# Patient Record
Sex: FEMALE | Race: WHITE | NOT HISPANIC OR LATINO | Employment: FULL TIME | URBAN - METROPOLITAN AREA
[De-identification: names, ages, dates, MRNs, and addresses within clinical notes are randomized per-mention and may not be internally consistent; named-entity substitution may affect disease eponyms.]

---

## 2023-02-28 LAB
AMPHETAMINE SCREEN BLOOD: NEGATIVE NG/ML
BARBITURATE SCREEN BLOOD: NEGATIVE NG/ML
BENZODIAZEPINES SCREEN BLOOD: NEGATIVE NG/ML
BUPRENORPHINE SCREEN BLOOD: NEGATIVE NG/ML
CANNABINOIDS SCREEN BLOOD: NEGATIVE NG/ML
COCAINE SCREEN BLOOD: NEGATIVE NG/ML
DRUG SCREEN COMMENT BLOOD: NORMAL
METHADONE SCREEN BLOOD: NEGATIVE NG/ML
METHAMPHETAMINE, BLOOD, SCREEN: NEGATIVE NG/ML
OPIATE SCREEN BLOOD: NEGATIVE NG/ML
OXYCODONE SCREEN BLOOD: NEGATIVE NG/ML
PHENCYCLIDINE SCREEN BLOOD: NEGATIVE NG/ML

## 2023-03-01 LAB
NIL(NEG) CONTROL SPOT COUNT: NORMAL
PANEL A SPOT COUNT: 0
PANEL B SPOT COUNT: 0
POS CONTROL SPOT COUNT: NORMAL
T-SPOT. TB INTERPRETATION: NEGATIVE

## 2023-09-11 LAB
ALANINE AMINOTRANSFERASE (SGPT) (U/L) IN SER/PLAS: 23 U/L (ref 7–45)
ALBUMIN (G/DL) IN SER/PLAS: 4 G/DL (ref 3.4–5)
ALKALINE PHOSPHATASE (U/L) IN SER/PLAS: 47 U/L (ref 33–110)
ANION GAP IN SER/PLAS: 12 MMOL/L (ref 10–20)
ASPARTATE AMINOTRANSFERASE (SGOT) (U/L) IN SER/PLAS: 19 U/L (ref 9–39)
BASOPHILS (10*3/UL) IN BLOOD BY AUTOMATED COUNT: 0.1 X10E9/L (ref 0–0.1)
BASOPHILS/100 LEUKOCYTES IN BLOOD BY AUTOMATED COUNT: 1.3 % (ref 0–2)
BILIRUBIN TOTAL (MG/DL) IN SER/PLAS: 0.4 MG/DL (ref 0–1.2)
C REACTIVE PROTEIN (MG/L) IN SER/PLAS: 0.27 MG/DL
CALCIUM (MG/DL) IN SER/PLAS: 9.3 MG/DL (ref 8.6–10.3)
CARBON DIOXIDE, TOTAL (MMOL/L) IN SER/PLAS: 25 MMOL/L (ref 21–32)
CHLORIDE (MMOL/L) IN SER/PLAS: 105 MMOL/L (ref 98–107)
CREATININE (MG/DL) IN SER/PLAS: 0.74 MG/DL (ref 0.5–1.05)
EOSINOPHILS (10*3/UL) IN BLOOD BY AUTOMATED COUNT: 0.19 X10E9/L (ref 0–0.7)
EOSINOPHILS/100 LEUKOCYTES IN BLOOD BY AUTOMATED COUNT: 2.4 % (ref 0–6)
ERYTHROCYTE DISTRIBUTION WIDTH (RATIO) BY AUTOMATED COUNT: 11.8 % (ref 11.5–14.5)
ERYTHROCYTE MEAN CORPUSCULAR HEMOGLOBIN CONCENTRATION (G/DL) BY AUTOMATED: 33.3 G/DL (ref 32–36)
ERYTHROCYTE MEAN CORPUSCULAR VOLUME (FL) BY AUTOMATED COUNT: 95 FL (ref 80–100)
ERYTHROCYTES (10*6/UL) IN BLOOD BY AUTOMATED COUNT: 4.55 X10E12/L (ref 4–5.2)
GFR FEMALE: >90 ML/MIN/1.73M2
GLUCOSE (MG/DL) IN SER/PLAS: 83 MG/DL (ref 74–99)
HEMATOCRIT (%) IN BLOOD BY AUTOMATED COUNT: 43.2 % (ref 36–46)
HEMOGLOBIN (G/DL) IN BLOOD: 14.4 G/DL (ref 12–16)
IMMATURE GRANULOCYTES/100 LEUKOCYTES IN BLOOD BY AUTOMATED COUNT: 0.3 % (ref 0–0.9)
LEUKOCYTES (10*3/UL) IN BLOOD BY AUTOMATED COUNT: 7.8 X10E9/L (ref 4.4–11.3)
LYMPHOCYTES (10*3/UL) IN BLOOD BY AUTOMATED COUNT: 3.22 X10E9/L (ref 1.2–4.8)
LYMPHOCYTES/100 LEUKOCYTES IN BLOOD BY AUTOMATED COUNT: 41.4 % (ref 13–44)
MONOCYTES (10*3/UL) IN BLOOD BY AUTOMATED COUNT: 0.74 X10E9/L (ref 0.1–1)
MONOCYTES/100 LEUKOCYTES IN BLOOD BY AUTOMATED COUNT: 9.5 % (ref 2–10)
NEUTROPHILS (10*3/UL) IN BLOOD BY AUTOMATED COUNT: 3.5 X10E9/L (ref 1.2–7.7)
NEUTROPHILS/100 LEUKOCYTES IN BLOOD BY AUTOMATED COUNT: 45.1 % (ref 40–80)
PLATELETS (10*3/UL) IN BLOOD AUTOMATED COUNT: 361 X10E9/L (ref 150–450)
POTASSIUM (MMOL/L) IN SER/PLAS: 4 MMOL/L (ref 3.5–5.3)
PROTEIN TOTAL: 7.5 G/DL (ref 6.4–8.2)
SODIUM (MMOL/L) IN SER/PLAS: 138 MMOL/L (ref 136–145)
THYROTROPIN (MIU/L) IN SER/PLAS BY DETECTION LIMIT <= 0.05 MIU/L: 0.82 MIU/L (ref 0.44–3.98)
UREA NITROGEN (MG/DL) IN SER/PLAS: 10 MG/DL (ref 6–23)

## 2023-09-12 LAB
DEAMIDATED GLIADIN PEPTIDE IGA: <1 U/ML (ref 0–14)
DEAMIDATED GLIADIN PEPTIDE IGG: <1 U/ML (ref 0–14)
TISSUE TRANSGLUTAMINASE IGG: <1 U/ML (ref 0–14)
TISSUE TRANSGLUTAMINASE, IGA: <1 U/ML (ref 0–14)

## 2023-12-26 DIAGNOSIS — Z30.41 SURVEILLANCE OF PREVIOUSLY PRESCRIBED CONTRACEPTIVE PILL: Primary | ICD-10-CM

## 2023-12-28 RX ORDER — NORETHINDRONE ACETATE AND ETHINYL ESTRADIOL 1MG-20(21)
1 KIT ORAL DAILY
Qty: 84 TABLET | Refills: 4 | Status: SHIPPED | OUTPATIENT
Start: 2023-12-28 | End: 2024-05-20 | Stop reason: SDUPTHER

## 2024-05-16 ENCOUNTER — APPOINTMENT (OUTPATIENT)
Dept: OBSTETRICS AND GYNECOLOGY | Facility: CLINIC | Age: 24
End: 2024-05-16
Payer: COMMERCIAL

## 2024-05-17 ASSESSMENT — ENCOUNTER SYMPTOMS
ABDOMINAL DISTENTION: 0
WEAKNESS: 0
FATIGUE: 0
CHEST TIGHTNESS: 0
DIFFICULTY URINATING: 0
ABDOMINAL PAIN: 0
ACTIVITY CHANGE: 0
HEADACHES: 0
DIZZINESS: 0
DYSURIA: 0
UNEXPECTED WEIGHT CHANGE: 0
JOINT SWELLING: 0
SHORTNESS OF BREATH: 0
ADENOPATHY: 0
COLOR CHANGE: 0

## 2024-05-17 NOTE — PROGRESS NOTES
"Annual  Subjective   Florence Raygoza is a 23 y.o. female who is here for a routine exam.   Complaints:   Here for both ocp refill and ongoing vulvovag irritation=reports 3 yr hx  fissures post introitus and upper rt interlabial crease ; has NOT resolved with antifungals, reports  Aquaphor soothing but smells bad so doesn't like to use. aggravated by sex and pad use with menses/has  used tampons but finds these uncomfortable still., despite sexual activity.         reports using lube with sex which SOMETIMES help, but sometimes NOT able to complete due to this pain. Current relationship LONG DISTANCE= no sex for several wks, then freq coitus over 2-3 days.  PMHx; Vulvodynia.  Surg Hx: EGD 11/23/2022  Father: alive 48 yrs, Mother: alive 47 yrs,   Last pap   BCM= Condoms, OCPs.   Periods : every month, normal blood loss.   Menarche 15. LMP=  STDs -last screened 2/28/22 NEG;  gardasil no.   currently sexually active; denies exposure concerns/declines std screen.   Total preg  0.    Review of Systems   Constitutional:  Negative for activity change, fatigue and unexpected weight change.   Respiratory:  Negative for chest tightness and shortness of breath.    Cardiovascular:  Negative for chest pain and leg swelling.   Gastrointestinal:  Negative for abdominal distention and abdominal pain.   Genitourinary:  Negative for difficulty urinating, dysuria, genital sores, pelvic pain, vaginal bleeding, vaginal discharge and vaginal pain.   Musculoskeletal:  Negative for gait problem and joint swelling.   Skin:  Negative for color change and rash.   Neurological:  Negative for dizziness, weakness and headaches.   Hematological:  Negative for adenopathy.   Objective Visit Vitals  /62   Ht 1.651 m (5' 5\")   Wt 80.5 kg (177 lb 6.4 oz)   LMP 05/15/2024 (Approximate)   BMI 29.52 kg/m²   BSA 1.92 m²       General:   Alert and oriented, in no acute distress   Neck: Supple. No visible thyromegaly.    Breast/Axilla: Normal to " palpation bilaterally without masses, skin changes, or nipple discharge.    Abdomen: Soft, non-tender, without masses or organomegaly   Vulva: 2 cm fissure btwn R labia; opst introitus diffusely red/no ulcer/fissure.    Vagina: Normal mucosa without lesions, masses, or atrophy. No abnormal vaginal discharge.    Cervix: Normal without masses, lesions, or signs of cervicitis; pap sent.   Uterus: Normal, mobile, non-enlarged uterus   Adnexa: No palpable  masses or tenderness.   Pelvic Floor normal   Psych Normal affect. Normal mood.    Assessment/Plan   Encounter Diagnoses   Name Primary?    Visit for gynecologic examination; grossly nl breast exam; gyn exam pos for persistent interlabial fissure/post introital irritation. Yes    Screening for human papillomavirus; pap sent     Surveillance of previously prescribed contraceptive pill; remains pleased w menstrual relief; denies CI.     Contraceptive education; wanting to cont pills     Vulvodynia; exam neg for infection/skin dysplasias.     Vulvar fissure; advised need for consistent protection/ tx to heal. Apply ert every night, and A and D ointment every day.     Dyspareunia in female; expected to improve once tissues healed.     Chronic vulvitis; rvwd pericare/keep vulva cool and dry.     Anita Beebe MD

## 2024-05-20 ENCOUNTER — OFFICE VISIT (OUTPATIENT)
Dept: OBSTETRICS AND GYNECOLOGY | Facility: CLINIC | Age: 24
End: 2024-05-20
Payer: COMMERCIAL

## 2024-05-20 VITALS
BODY MASS INDEX: 29.56 KG/M2 | WEIGHT: 177.4 LBS | HEIGHT: 65 IN | SYSTOLIC BLOOD PRESSURE: 122 MMHG | DIASTOLIC BLOOD PRESSURE: 62 MMHG

## 2024-05-20 DIAGNOSIS — N94.10 DYSPAREUNIA IN FEMALE: ICD-10-CM

## 2024-05-20 DIAGNOSIS — Z30.09 CONTRACEPTIVE EDUCATION: ICD-10-CM

## 2024-05-20 DIAGNOSIS — Z30.41 SURVEILLANCE OF PREVIOUSLY PRESCRIBED CONTRACEPTIVE PILL: ICD-10-CM

## 2024-05-20 DIAGNOSIS — N76.3 CHRONIC VULVITIS: ICD-10-CM

## 2024-05-20 DIAGNOSIS — N90.89 VULVAR FISSURE: ICD-10-CM

## 2024-05-20 DIAGNOSIS — Z01.419 VISIT FOR GYNECOLOGIC EXAMINATION: Primary | ICD-10-CM

## 2024-05-20 DIAGNOSIS — N94.819 VULVODYNIA: ICD-10-CM

## 2024-05-20 DIAGNOSIS — Z11.51 SCREENING FOR HUMAN PAPILLOMAVIRUS: ICD-10-CM

## 2024-05-20 PROCEDURE — 88175 CYTOPATH C/V AUTO FLUID REDO: CPT | Mod: TC,WESLAB | Performed by: OBSTETRICS & GYNECOLOGY

## 2024-05-20 PROCEDURE — 99395 PREV VISIT EST AGE 18-39: CPT | Performed by: OBSTETRICS & GYNECOLOGY

## 2024-05-20 PROCEDURE — 1036F TOBACCO NON-USER: CPT | Performed by: OBSTETRICS & GYNECOLOGY

## 2024-05-20 RX ORDER — NORETHINDRONE ACETATE AND ETHINYL ESTRADIOL 1MG-20(21)
1 KIT ORAL DAILY
Qty: 84 TABLET | Refills: 4 | Status: SHIPPED | OUTPATIENT
Start: 2024-05-20

## 2024-05-20 RX ORDER — ESTRADIOL 0.1 MG/G
2 CREAM VAGINAL NIGHTLY
Qty: 34 G | Refills: 3 | Status: SHIPPED | OUTPATIENT
Start: 2024-05-20 | End: 2025-05-20

## 2024-05-20 ASSESSMENT — LIFESTYLE VARIABLES
HOW MANY STANDARD DRINKS CONTAINING ALCOHOL DO YOU HAVE ON A TYPICAL DAY: PATIENT DOES NOT DRINK
SKIP TO QUESTIONS 9-10: 1
HOW OFTEN DO YOU HAVE SIX OR MORE DRINKS ON ONE OCCASION: NEVER
AUDIT-C TOTAL SCORE: 0
HOW OFTEN DO YOU HAVE A DRINK CONTAINING ALCOHOL: NEVER

## 2024-05-20 ASSESSMENT — PATIENT HEALTH QUESTIONNAIRE - PHQ9
1. LITTLE INTEREST OR PLEASURE IN DOING THINGS: NOT AT ALL
SUM OF ALL RESPONSES TO PHQ9 QUESTIONS 1 & 2: 0
2. FEELING DOWN, DEPRESSED OR HOPELESS: NOT AT ALL

## 2024-05-20 ASSESSMENT — ENCOUNTER SYMPTOMS
DEPRESSION: 0
LOSS OF SENSATION IN FEET: 0
OCCASIONAL FEELINGS OF UNSTEADINESS: 0

## 2024-05-20 ASSESSMENT — PAIN SCALES - GENERAL: PAINLEVEL: 0-NO PAIN

## 2024-05-23 ENCOUNTER — TELEPHONE (OUTPATIENT)
Dept: OBSTETRICS AND GYNECOLOGY | Facility: CLINIC | Age: 24
End: 2024-05-23
Payer: COMMERCIAL

## 2024-05-23 DIAGNOSIS — N95.2 ATROPHIC VULVOVAGINITIS: Primary | ICD-10-CM

## 2024-05-23 NOTE — TELEPHONE ENCOUNTER
Est pt last seen 05/2024 / pt requesting alternative for Estrace cream as cost was $70.00 / advised message to Dr Beebe / pt aware Dr eBebe gone for the day

## 2024-05-29 LAB
CYTOLOGY CMNT CVX/VAG CYTO-IMP: NORMAL
LAB AP CONTRACEPTIVE HISTORY: NORMAL
LAB AP HPV GENOTYPE QUESTION: YES
LAB AP HPV HR: NORMAL
LABORATORY COMMENT REPORT: NORMAL
LMP START DATE: NORMAL
PATH REPORT.TOTAL CANCER: NORMAL

## 2024-06-12 NOTE — PROGRESS NOTES
"Florence Raygoza comes in for gyn concern of follow up vulvar fissure/post introitus irritation. Was to tx w topical ert each night, A and D ointment each day.  Florence reports significant improvement in symptoms, however there is still 1 spot at the base of right labia that is very tender and uncomfortable to sit on.  She denies any  bleeding or exudate from this area.  She was able to complete coitus a couple days ago without significant discomfort.  Visit Vitals  /64   Ht 1.651 m (5' 5\")   Wt 79.6 kg (175 lb 6.4 oz)   LMP 05/15/2024 (Approximate)   BMI 29.19 kg/m²   Smoking Status Never   BSA 1.91 m²        LMP 05/15/2024 (Approximate)    Constitutional/general:Well developed...Well nourished...Habitus..   Neuro/psych:Oriented x 3Mood/affect  Gyn:   External genitalia: The prior fissure between right labia majora and minora is almost completely healed with healthy looking pink skin; there is a pinpoint opening at the very top of this crease on the right side of the clitoral structure and a another 2 mm long residual fissure with mild erythema at the base of this crease/this is the site of persistent tenderness.  The left side of the vulva is unremarkable.  Urethra: Grossly normal  Vagina: Posterior introital erythema has resolved  Pelvic support: Internal exam is deferred due to patient currently on menses with tampon in place; also asymptomatic for internal issues.  Anus/perineum: Normal    Encounter Diagnoses   Name Primary?    Vulvar fissure; almost completely healed; demonstrated current state of tissues with patient using hand-held mirror; she will apply a Q-tip amount of estrogen cream nightly to the areas that are still open; she will continue to apply vitamin A&E ointment each morning; advised storing these medications in a refrigerated setting for cool application. Yes    Vulvodynia; improved with treatment of vulvar lesions     Dyspareunia in female; improved per patient report.    Recent labs and " prescriptions reviewed with patient,.  Follow-up in 1 year or as needed.

## 2024-06-13 ENCOUNTER — OFFICE VISIT (OUTPATIENT)
Dept: OBSTETRICS AND GYNECOLOGY | Facility: CLINIC | Age: 24
End: 2024-06-13
Payer: COMMERCIAL

## 2024-06-13 VITALS
BODY MASS INDEX: 29.22 KG/M2 | HEIGHT: 65 IN | SYSTOLIC BLOOD PRESSURE: 110 MMHG | WEIGHT: 175.4 LBS | DIASTOLIC BLOOD PRESSURE: 64 MMHG

## 2024-06-13 DIAGNOSIS — N90.89 VULVAR FISSURE: Primary | ICD-10-CM

## 2024-06-13 DIAGNOSIS — N94.819 VULVODYNIA: ICD-10-CM

## 2024-06-13 DIAGNOSIS — N94.10 DYSPAREUNIA IN FEMALE: ICD-10-CM

## 2024-06-13 PROCEDURE — 1036F TOBACCO NON-USER: CPT | Performed by: OBSTETRICS & GYNECOLOGY

## 2024-06-13 PROCEDURE — 99213 OFFICE O/P EST LOW 20 MIN: CPT | Performed by: OBSTETRICS & GYNECOLOGY

## 2024-06-13 ASSESSMENT — LIFESTYLE VARIABLES
SKIP TO QUESTIONS 9-10: 1
HOW OFTEN DO YOU HAVE SIX OR MORE DRINKS ON ONE OCCASION: NEVER
HOW MANY STANDARD DRINKS CONTAINING ALCOHOL DO YOU HAVE ON A TYPICAL DAY: PATIENT DOES NOT DRINK
AUDIT-C TOTAL SCORE: 0
HOW OFTEN DO YOU HAVE A DRINK CONTAINING ALCOHOL: NEVER

## 2024-06-13 ASSESSMENT — PATIENT HEALTH QUESTIONNAIRE - PHQ9
2. FEELING DOWN, DEPRESSED OR HOPELESS: NOT AT ALL
SUM OF ALL RESPONSES TO PHQ9 QUESTIONS 1 & 2: 0
1. LITTLE INTEREST OR PLEASURE IN DOING THINGS: NOT AT ALL

## 2024-06-13 ASSESSMENT — ENCOUNTER SYMPTOMS
DEPRESSION: 0
LOSS OF SENSATION IN FEET: 0
OCCASIONAL FEELINGS OF UNSTEADINESS: 0

## 2024-06-13 ASSESSMENT — PAIN SCALES - GENERAL: PAINLEVEL: 0-NO PAIN

## 2024-12-27 ENCOUNTER — LAB (OUTPATIENT)
Dept: LAB | Facility: LAB | Age: 24
End: 2024-12-27
Payer: COMMERCIAL

## 2024-12-27 ENCOUNTER — APPOINTMENT (OUTPATIENT)
Dept: PRIMARY CARE | Facility: CLINIC | Age: 24
End: 2024-12-27
Payer: COMMERCIAL

## 2024-12-27 VITALS
BODY MASS INDEX: 26.66 KG/M2 | TEMPERATURE: 97.5 F | DIASTOLIC BLOOD PRESSURE: 74 MMHG | WEIGHT: 160 LBS | HEART RATE: 82 BPM | HEIGHT: 65 IN | SYSTOLIC BLOOD PRESSURE: 112 MMHG | OXYGEN SATURATION: 99 %

## 2024-12-27 DIAGNOSIS — Z01.84 IMMUNITY STATUS TESTING: ICD-10-CM

## 2024-12-27 DIAGNOSIS — Z11.7 ENCOUNTER FOR TESTING FOR LATENT TUBERCULOSIS: ICD-10-CM

## 2024-12-27 DIAGNOSIS — E55.9 VITAMIN D INSUFFICIENCY: ICD-10-CM

## 2024-12-27 DIAGNOSIS — Z00.00 ROUTINE GENERAL MEDICAL EXAMINATION AT A HEALTH CARE FACILITY: Primary | ICD-10-CM

## 2024-12-27 DIAGNOSIS — E78.5 BORDERLINE HYPERLIPIDEMIA: ICD-10-CM

## 2024-12-27 LAB
ALBUMIN SERPL BCP-MCNC: 4.1 G/DL (ref 3.4–5)
ALP SERPL-CCNC: 50 U/L (ref 33–110)
ALT SERPL W P-5'-P-CCNC: 20 U/L (ref 7–45)
ANION GAP SERPL CALC-SCNC: 11 MMOL/L (ref 10–20)
AST SERPL W P-5'-P-CCNC: 23 U/L (ref 9–39)
BILIRUB SERPL-MCNC: 0.5 MG/DL (ref 0–1.2)
BUN SERPL-MCNC: 13 MG/DL (ref 6–23)
CALCIUM SERPL-MCNC: 9.4 MG/DL (ref 8.6–10.3)
CHLORIDE SERPL-SCNC: 105 MMOL/L (ref 98–107)
CO2 SERPL-SCNC: 26 MMOL/L (ref 21–32)
CREAT SERPL-MCNC: 0.72 MG/DL (ref 0.5–1.05)
EGFRCR SERPLBLD CKD-EPI 2021: >90 ML/MIN/1.73M*2
ERYTHROCYTE [DISTWIDTH] IN BLOOD BY AUTOMATED COUNT: 11.9 % (ref 11.5–14.5)
GLUCOSE SERPL-MCNC: 102 MG/DL (ref 74–99)
HBV SURFACE AB SER-ACNC: <3.1 MIU/ML
HCT VFR BLD AUTO: 43.5 % (ref 36–46)
HGB BLD-MCNC: 14.4 G/DL (ref 12–16)
MCH RBC QN AUTO: 31.2 PG (ref 26–34)
MCHC RBC AUTO-ENTMCNC: 33.1 G/DL (ref 32–36)
MCV RBC AUTO: 94 FL (ref 80–100)
MEV IGG SER QL IA: POSITIVE
MUMPS IGG ANTIBODY INDEX: 1.3 IA
MUV IGG SER IA-ACNC: POSITIVE
NRBC BLD-RTO: 0 /100 WBCS (ref 0–0)
PLATELET # BLD AUTO: 332 X10*3/UL (ref 150–450)
POTASSIUM SERPL-SCNC: 4 MMOL/L (ref 3.5–5.3)
PROT SERPL-MCNC: 7.8 G/DL (ref 6.4–8.2)
RBC # BLD AUTO: 4.61 X10*6/UL (ref 4–5.2)
RUBEOLA IGG ANTIBODY INDEX: 3.9 IA
RUBV IGG SERPL IA-ACNC: 1.2 IA
RUBV IGG SERPL QL IA: POSITIVE
SODIUM SERPL-SCNC: 138 MMOL/L (ref 136–145)
TSH SERPL-ACNC: 0.53 MIU/L (ref 0.44–3.98)
VARICELLA ZOSTER IGG INDEX: 0.5 IA
VZV IGG SER QL IA: NEGATIVE
WBC # BLD AUTO: 5.9 X10*3/UL (ref 4.4–11.3)

## 2024-12-27 PROCEDURE — 86706 HEP B SURFACE ANTIBODY: CPT

## 2024-12-27 PROCEDURE — 36415 COLL VENOUS BLD VENIPUNCTURE: CPT

## 2024-12-27 PROCEDURE — 86481 TB AG RESPONSE T-CELL SUSP: CPT

## 2024-12-27 PROCEDURE — 86787 VARICELLA-ZOSTER ANTIBODY: CPT

## 2024-12-27 PROCEDURE — 86317 IMMUNOASSAY INFECTIOUS AGENT: CPT

## 2024-12-27 PROCEDURE — 86765 RUBEOLA ANTIBODY: CPT

## 2024-12-27 PROCEDURE — RXMED WILLOW AMBULATORY MEDICATION CHARGE

## 2024-12-27 PROCEDURE — 86735 MUMPS ANTIBODY: CPT

## 2024-12-27 ASSESSMENT — PATIENT HEALTH QUESTIONNAIRE - PHQ9
SUM OF ALL RESPONSES TO PHQ9 QUESTIONS 1 AND 2: 0
2. FEELING DOWN, DEPRESSED OR HOPELESS: NOT AT ALL
1. LITTLE INTEREST OR PLEASURE IN DOING THINGS: NOT AT ALL

## 2024-12-27 ASSESSMENT — VISUAL ACUITY
OS_CC: 20/20-1
OD_CC: 20/20

## 2024-12-27 NOTE — PROGRESS NOTES
Subjective     HPI   Florence Raygoza is a 23 y.o. year old female patient with presenting to clinic with concern for   Chief Complaint   Patient presents with    New Patient Visit     Has papers to be filled out for grad school- health requirements. Needs to be done by January 31st.        Florence presents to clinic to establish care as a new patient. Formerly under the care of [facility] school for physical therapy    IBS w constipation & Diarrhea  - diet controlled, probiotics  -metamucil PRN  -dicyclomine PRN  EGD 11/2022 wnl  Amitriptyline caused drowsiness the following morning    GERD  SIBO    Hx Low back pain  Hx Schmorl's node  Discitis 2019    GYN  Vulvodynia and contraception  Dr Beebe  - OCP    There is no problem list on file for this patient.      Past Medical History:   Diagnosis Date    Vulvodynia       Past Surgical History:   Procedure Laterality Date    ESOPHAGOGASTRODUODENOSCOPY  11/23/2022      Family History   Problem Relation Name Age of Onset    Breast cancer Paternal Grandmother        Social History     Tobacco Use    Smoking status: Never    Smokeless tobacco: Never   Substance Use Topics    Alcohol use: Never        Current Outpatient Medications:     estradiol (Estrace) 0.01 % (0.1 mg/gram) vaginal cream, Insert 0.5 Applicatorfuls (2 g) into the vagina once daily at bedtime. At bedtime for 2 weeks, then at bedtime twice a week., Disp: 34 g, Rfl: 3    estrogens, conjugated, (Premarin) vaginal cream, Apply pea sized amount to labial creases and posterior introitus nightly for  3 weeks, and then twice weekly thereafter., Disp: 30 g, Rfl: 3    norethindrone-e.estradioL-iron (Blisovi Fe 1/20, 28,) 1 mg-20 mcg (21)/75 mg (7) tablet, Take 1 tablet by mouth once daily., Disp: 84 tablet, Rfl: 4     Review of Systems  Constitutional: Denies fever  HEENT: Denies ST, earache  CVS: Denies Chest pain  Pulmonary: Denies wheezing, SOB  GI: Denies N/V  : Denies dysuria  Musculoskeletal:  " Denies myalgia  Neuro: Denies focal weakness or numbness.  Skin: Denies Rashes.  *Review of Systems is negative unless otherwise mentioned in HPI or ROS above.    Objective   /74   Pulse 82   Temp 36.4 °C (97.5 °F)   Ht 1.651 m (5' 5\")   Wt 72.6 kg (160 lb) Comment: stated  SpO2 99%   BMI 26.63 kg/m²  reviewed Body mass index is 26.63 kg/m².     Physical Exam  Constitutional: NAD.  Resting comfortably.  Head: Atraumatic, normocephalic.  ENT: Moist oral mucosa. Nasal mucosa wnl.   Cardiac: Regular rate & rhythm.   Pulmonary: Lungs clear bilat  GI: Soft, Nontender, nondistended.   Musculoskeletal: No peripheral edema.   Skin: No evidence of trauma. No rashes  Psych: Intact judgement and insight.    .Assessment/Plan   Problem List Items Addressed This Visit    None  Visit Diagnoses         Codes    Routine general medical examination at a health care facility    -  Primary Z00.00    Immunity status testing     Z01.84    Relevant Orders    Mumps Antibody, IgG    Rubella Antibody, IgG    Rubeola Antibody, IgG    Varicella Zoster Antibody, IgG    Hepatitis B Surface Antibody    Encounter for testing for latent tuberculosis     Z11.7    Relevant Orders    T-Spot TB    Borderline hyperlipidemia     E78.5    Relevant Orders    CBC    Comprehensive Metabolic Panel    TSH with reflex to Free T4 if abnormal    Vitamin D insufficiency     E55.9            "

## 2024-12-30 ENCOUNTER — PHARMACY VISIT (OUTPATIENT)
Dept: PHARMACY | Facility: CLINIC | Age: 24
End: 2024-12-30
Payer: COMMERCIAL

## 2024-12-30 LAB
NIL(NEG) CONTROL SPOT COUNT: NORMAL
PANEL A SPOT COUNT: 2
PANEL B SPOT COUNT: 0
POS CONTROL SPOT COUNT: NORMAL
T-SPOT. TB INTERPRETATION: NEGATIVE

## 2024-12-31 ENCOUNTER — APPOINTMENT (OUTPATIENT)
Dept: PRIMARY CARE | Facility: CLINIC | Age: 24
End: 2024-12-31
Payer: COMMERCIAL

## 2025-01-03 DIAGNOSIS — Z01.84 IMMUNITY STATUS TESTING: Primary | ICD-10-CM

## 2025-01-03 DIAGNOSIS — Z78.9 NOT IMMUNE TO HEPATITIS B VIRUS: ICD-10-CM

## 2025-01-17 DIAGNOSIS — Z30.41 SURVEILLANCE OF PREVIOUSLY PRESCRIBED CONTRACEPTIVE PILL: ICD-10-CM

## 2025-01-19 RX ORDER — NORETHINDRONE ACETATE AND ETHINYL ESTRADIOL 1MG-20(21)
1 KIT ORAL DAILY
Qty: 84 TABLET | Refills: 4 | Status: SHIPPED | OUTPATIENT
Start: 2025-01-19

## 2025-01-29 ENCOUNTER — TELEPHONE (OUTPATIENT)
Dept: OBSTETRICS AND GYNECOLOGY | Facility: CLINIC | Age: 25
End: 2025-01-29

## 2025-01-29 ENCOUNTER — OFFICE VISIT (OUTPATIENT)
Dept: OBSTETRICS AND GYNECOLOGY | Facility: CLINIC | Age: 25
End: 2025-01-29
Payer: COMMERCIAL

## 2025-01-29 VITALS
WEIGHT: 167 LBS | DIASTOLIC BLOOD PRESSURE: 62 MMHG | HEIGHT: 65 IN | BODY MASS INDEX: 27.82 KG/M2 | SYSTOLIC BLOOD PRESSURE: 118 MMHG

## 2025-01-29 DIAGNOSIS — R30.0 DYSURIA: ICD-10-CM

## 2025-01-29 DIAGNOSIS — N76.3 CHRONIC VULVITIS: ICD-10-CM

## 2025-01-29 DIAGNOSIS — N90.89 VULVAR LESION: Primary | ICD-10-CM

## 2025-01-29 DIAGNOSIS — N94.819 VULVODYNIA: ICD-10-CM

## 2025-01-29 DIAGNOSIS — Z30.41 SURVEILLANCE OF PREVIOUSLY PRESCRIBED CONTRACEPTIVE PILL: ICD-10-CM

## 2025-01-29 LAB
POC APPEARANCE, URINE: CLEAR
POC BILIRUBIN, URINE: NEGATIVE
POC BLOOD, URINE: ABNORMAL
POC COLOR, URINE: YELLOW
POC GLUCOSE, URINE: NEGATIVE MG/DL
POC KETONES, URINE: NEGATIVE MG/DL
POC LEUKOCYTES, URINE: ABNORMAL
POC NITRITE,URINE: NEGATIVE
POC PH, URINE: 6 PH
POC PROTEIN, URINE: NEGATIVE MG/DL
POC SPECIFIC GRAVITY, URINE: 1.02
POC UROBILINOGEN, URINE: 0.2 EU/DL

## 2025-01-29 PROCEDURE — 3008F BODY MASS INDEX DOCD: CPT | Performed by: OBSTETRICS & GYNECOLOGY

## 2025-01-29 PROCEDURE — 99213 OFFICE O/P EST LOW 20 MIN: CPT | Performed by: OBSTETRICS & GYNECOLOGY

## 2025-01-29 PROCEDURE — 81003 URINALYSIS AUTO W/O SCOPE: CPT | Performed by: OBSTETRICS & GYNECOLOGY

## 2025-01-29 RX ORDER — LEVONORGESTREL / ETHINYL ESTRADIOL AND ETHINYL ESTRADIOL 150-30(84)
1 KIT ORAL DAILY
Qty: 91 TABLET | Refills: 3 | Status: SHIPPED | OUTPATIENT
Start: 2025-01-29

## 2025-01-29 ASSESSMENT — PATIENT HEALTH QUESTIONNAIRE - PHQ9
1. LITTLE INTEREST OR PLEASURE IN DOING THINGS: NOT AT ALL
2. FEELING DOWN, DEPRESSED OR HOPELESS: NOT AT ALL
SUM OF ALL RESPONSES TO PHQ9 QUESTIONS 1 & 2: 0

## 2025-01-29 ASSESSMENT — ENCOUNTER SYMPTOMS
LOSS OF SENSATION IN FEET: 0
OCCASIONAL FEELINGS OF UNSTEADINESS: 0
DEPRESSION: 0

## 2025-01-29 ASSESSMENT — PAIN SCALES - GENERAL: PAINLEVEL_OUTOF10: 2

## 2025-01-29 ASSESSMENT — LIFESTYLE VARIABLES
HOW MANY STANDARD DRINKS CONTAINING ALCOHOL DO YOU HAVE ON A TYPICAL DAY: PATIENT DOES NOT DRINK
AUDIT-C TOTAL SCORE: 0
HOW OFTEN DO YOU HAVE A DRINK CONTAINING ALCOHOL: NEVER
HOW OFTEN DO YOU HAVE SIX OR MORE DRINKS ON ONE OCCASION: NEVER
SKIP TO QUESTIONS 9-10: 1

## 2025-01-29 NOTE — PROGRESS NOTES
"Florence Raygoza comes in for gyn concern of  recurrent vaginal lesions; last seen June 2024 for recurrent vulvar fissuring posterior introital irritation; had been prescribed topical estrogen therapy at bedtime with A&E ointment as a barrier for daytime use.   Recent coital activity has reirritated this area; patient reports inconsistent use of vaginal estrogen therapy at night; using A&E ointment as needed.  Menstrual products also seem to be irritant; patient not comfortable with tampon insertion; notes irritation from pads.  Denies any new cleansing agents, recent travel, or new partners.  Does endorse recent coital activity as irritant, but denies any use of new lubricants or gels.     /62   Ht 1.651 m (5' 5\")   Wt 75.8 kg (167 lb)   LMP 01/16/2025 (Exact Date)   BMI 27.79 kg/m²    Constitutional/general: Well developed./Well nourished. BMI 28  Neuro/psych: Oriented x 3; Mood/affect-pleasant  External genitalia: grossly nl  Urethra: nl  Vagina: post introitus red/appears abraded; upper vault mucosa appears normal  Anus/perineum: perineal body with several vertical fissures c/w stretching trauma  Encounter Diagnoses   Name Primary?    Vulvar lesion; recurrent perineal fissuring s/p coital activity; advised resume regular nightly vag ert and daily emollient.  Yes    Vulvodynia; if topical txs do not resolve, consider referral.     Chronic vulvitis; as above; encouraged trialing tampons on heaviest days of menses since pads worsen her sxs.     Dysuria; UA screen pos; will send for culture.     Surveillance of previously prescribed contraceptive pill; needs refill.     Anita Beebe MD  "

## 2025-01-31 LAB
BACTERIA UR CULT: NORMAL
HSV1 DNA SPEC QL NAA+PROBE: NORMAL
HSV2 DNA SPEC QL NAA+PROBE: NORMAL
SPECIMEN SOURCE: NORMAL

## 2025-02-05 LAB
BACTERIA UR CULT: NORMAL
HSV1 DNA SPEC QL NAA+PROBE: NOT DETECTED
HSV2 DNA SPEC QL NAA+PROBE: NOT DETECTED
SPECIMEN SOURCE: NORMAL

## 2025-02-20 ENCOUNTER — TELEPHONE (OUTPATIENT)
Dept: OBSTETRICS AND GYNECOLOGY | Facility: CLINIC | Age: 25
End: 2025-02-20
Payer: COMMERCIAL

## 2025-02-20 NOTE — TELEPHONE ENCOUNTER
Spoke to pt advised of Dr Greenfield message / pt agrees to stop ocp for the next few months and will call with update

## 2025-02-20 NOTE — TELEPHONE ENCOUNTER
Est pt last seen 01/29/2025 vulva lesions / pt calling for test results / hsv results given / Pt  c/o ongoing vaginal irritation / dryness / pain / open scratch like areas / pt using Aquaphor / Pt wanting to know what can be the cause sx and tx to relieve sx / Pt states she is now on her menses and area will become more irritated due to wearing pads/ pt unable to use tampons/ advised message to Dr Beebe

## 2025-04-03 LAB — HBV SURFACE AG SERPL QL IA: NORMAL

## 2025-04-09 DIAGNOSIS — Z01.84 IMMUNITY STATUS TESTING: Primary | ICD-10-CM

## 2025-04-12 LAB — HBV SURFACE AB SERPL IA-ACNC: 607 MIU/ML

## 2025-07-23 ENCOUNTER — TELEPHONE (OUTPATIENT)
Dept: PRIMARY CARE | Facility: CLINIC | Age: 25
End: 2025-07-23
Payer: COMMERCIAL

## 2025-07-23 DIAGNOSIS — S06.0XAS CONCUSSION WITH UNKNOWN LOSS OF CONSCIOUSNESS STATUS, SEQUELA: Primary | ICD-10-CM

## 2025-07-24 ENCOUNTER — EVALUATION (OUTPATIENT)
Dept: PHYSICAL THERAPY | Facility: CLINIC | Age: 25
End: 2025-07-24
Payer: COMMERCIAL

## 2025-07-24 DIAGNOSIS — S06.0XAA CONCUSSION: ICD-10-CM

## 2025-07-24 DIAGNOSIS — S06.0X0D CONCUSSION WITHOUT LOSS OF CONSCIOUSNESS, SUBSEQUENT ENCOUNTER: Primary | ICD-10-CM

## 2025-07-24 DIAGNOSIS — S06.0XAS CONCUSSION WITH UNKNOWN LOSS OF CONSCIOUSNESS STATUS, SEQUELA: Primary | ICD-10-CM

## 2025-07-24 PROCEDURE — 97161 PT EVAL LOW COMPLEX 20 MIN: CPT | Mod: GP

## 2025-07-24 PROCEDURE — 97112 NEUROMUSCULAR REEDUCATION: CPT | Mod: GP

## 2025-07-24 NOTE — PROGRESS NOTES
"Physical Therapy    Physical Therapy Evaluation and Treatment      Patient Name: Florence Raygoza  MRN: [id number]  Today's Date: 7/24/2025  Time Calculation  Start Time: 1504  Stop Time: 1549  Time Calculation (min): 45 min    Insurance - reviewed   Visit number: 1 (updated 07/24/25)   Payor: RUI / Plan: ANTHEM HMP / Product Type: *No Product type* /    60 PT/OT/ST V PCY 3 USED NO AUTH NEEDED PAYS % OOP MET   Referring Provider: Sheila Mccrary PA-C     Assessment:      Florence Raygoza  is a 24 y.o. old patient who participated in a physical therapy evaluation today due to ongoing post concussion symptoms after getting hit in the head on 7/3/25 and the falling backwards hitting her head on 7/4/25. Florence presents with headache and general \"pressure\" in her head, dizziness with cervical AROM and at baseline describing it as unsteadiness, and light > noise sensitivity.  Vestibular/ocular motor screening (VOMS) performed and oculomotor was impaired with increase in symptoms per VOMS in objective session. Updated HEP to address these deficits.  Due to these impairments, she has the following functional limitations and participation restrictions: difficulty performing recreational activities such as volleyball, unable to run, sleeping more than usual and difficulty participating in school related activities.   Florence may benefit from speech referral due to reported difficulty concentrating, recalling information, and word finding. Communication sent to referring provider 7/24/2025. Florence's clinical presentation is Stable and/or uncomplicated characteristics with the level of complexity being low. Florence's potential for rehab is good.  Skilled physical therapy services are appropriate and beneficial in order to achieve measurable and meaningful change in the objective tests and measures. Utilization of skilled physical therapy services will aid in advancing her functional status and attaining her therapy-related " "goals. Florence verbalized understanding and is in agreement with all goals and plan of care.  Florence left session with all questions answered and no increase in symptoms.      Plan:  OP PT Plan  Treatment/Interventions: Cryotherapy, Canalith repositioning, Dry needling, Education/ Instruction, Gait training, Manual therapy, Neuromuscular re-education, Self care/ home management, Taping techniques, Therapeutic activities, Therapeutic exercises, Electrical stimulation  PT Plan: Skilled PT  PT Frequency: 1 time per week (1 -2 times per week)  Duration: 4-12 weeks  Onset Date: 07/03/25  Rehab Potential: Good  Plan of Care Agreement: Patient  Consider BPPV assessment if needed (screening negative for BPPV on 7/24).  Consider exertion next session as Florence eager to return to [employer].    Current Problem:   Problem List Items Addressed This Visit    None  Visit Diagnoses         Codes      Concussion without loss of consciousness, subsequent encounter    -  Primary S06.0X0D    Relevant Orders    Follow Up In Physical Therapy              Subjective    General:      Florence Raygoza  is a 24 y.o. female  presenting to the clinic with chief complaint of ongoing post concussion symptoms.  Florence Raygoza  reports symptoms began on 7/3/25 after getting hit in the head during a volleyball match.  She also reports that she fell backwards and hit her head on 7/4/25 which exacerbated symptoms from the previous day.  Difficulty concentrating, recalling information, word finding. Also has light > noise sensitivity, reports \"pressure\" in her head and unsteadiness.     Reports symptoms are better with sleep.     Reports symptoms are worse with school.    Florence Raygoza  denies:  numbness, tingling, diplopia, dysarthria, and dysphagia    Prior Treatment/diagnostic images: went to concussion clinic at outside hospital and advised to rest.      Medical History Form: Reviewed (scanned into chart)    Living Environment: reviewed and no " "concern    Occupation: DPT student, works part time as grocery store curbside      Prior Level of Function: IND, played volleyball and ran 2x/week    Exercise: not since injury     Functional Limitations: unable to play volleyball, work, limitations with school     Pt goals for therapy:  \"to be fine again\", return to school     Precautions:  Fall Risk: Low   Past Surgical history: endoscopy, colonoscopy  Past Medical history:  IBS, chronic LBP being managed by PT    History of migraines:  No  History of ADHD and learning disorders:  No  History of anxiety or depression:  No   History of previous concussion: No      Pain:  5/10  pain location: headache         Objective   Cervical AROM: symmetrical and WFL.  Increased dizziness with all cervical AROM    Vestibular/ocular motor screening (VOMS) :   Baseline symptoms: Headache 5/10; Nausea 0/10; Dizziness 2/10; Fogginess 3/10    Smooth pursuits = WNL and No symptoms  Horizontal saccades = increase in dizziness with 13 seconds to resolve  Vertical saccades = increase in dizziness > horizontal and 14 seconds to resolve     VOR (horizontal) = increase in dizziness  VOR (vertical)= deferred due to symptoms  VOR cancellation =  deferred due to symptoms  Convergence = increase in dizziness and HA and 15 seconds to resolve       Outcome Measures:  Other Measures  Other Outcome Measures: 61 (Concussion Symptom Scale)     Treatments:    Neuromuscular Re-education: 20 minutes   Access Code: [email or web address]  URL: [email or web address]  Date: 07/24/2025  Prepared by:     Exercises  - Pencil Pushups  - 1 x daily - 7 x weekly - 3 sets - 10 reps  - Seated Horizontal Saccades  - 1 x daily - 7 x weekly - 3 sets - 10 reps  -Florence  advised symptoms should not increase >3 in intensity with completion of exercises - if so, Florence Raygoza has likely have done too much. Advised to decrease duration or # of sets if symptoms INC >3.   -Florence advised symptoms should " return to baseline before completing next set.  -Florence advised that increase in symptoms should only be temporary and symptoms should return completely to the baseline level before continuing onto other exercises or tasks. Florence  advised to hold exercises for the day if it takes more than twenty minutes to resolve. Florence verbalized understanding.      Grandpa/ma Schedule.  Regulate your schedule, go to bed at the same time get up at the same time, try to eat at the same time, etc. Developing consistent routine, especially with sleep/wake cycles, in order to help improve brain functioning/healing.     Increasing hydration to help improve brain functioning/healing, decrease headache symptoms.     Walking program with the goal of 20 minutes per day.  Start off with a few minutes and monitor symptoms.  Keep the pace mild and as tolerated.      Limit screen time to essential functions only. This includes TV, cell phone, tablet, computer, etc.  Do not exceed 15 minutes at a time. Consider night shift and dark mode setting on your phone and computer to help reduce blue light exposure.  Demoed how to adjust.    If possible, do not sit under industrial lights and limit to 15 minutes at a time.  Natural lighting is preferred.      That increase in symptoms should only be temporary and symptoms should return completely to the baseline level before continuing onto other exercises or tasks.  Stop the activity if it takes longer than 20 minutes to return to your baseline level.      Importance of monitoring symptoms and to remove self from situations/take breaks from activities that cause increase in symptoms to increase help improve brain functioning/healing.      EDUCATION:  Outpatient Education  Individual(s) Educated: Patient  Education Provided: Anatomy, Fall Risk, Home Exercise Program, Home Safety, Physiology, POC  Risk and Benefits Discussed with Patient/Caregiver/Other: yes  Patient/Caregiver Demonstrated  Understanding: yes  Plan of Care Discussed and Agreed Upon: yes  Patient Response to Education: Patient/Caregiver Verbalized Understanding of Information      Goals:      STG's (within 2 weeks)    Florence Raygoza will demonstrate independence,  excellent understanding of and report compliance with at least 3 exercises in her HEP to facilitate the learning process to maximize PT benefits and to facilitate independent rehab program upon discharge.    LTG's (by discharge)    Florence  will decrease Concussion symptom scale to 0 to improve occupational, recreational, and school tasks.   Baseline Concussion symptom score   Total # symptoms: 17/22  Severity: 61 /132     Vestibular/ocular motor screening (VOMS) to be WNL and without symptoms in order to return to PLOF and to safely return to sporting/volleyball activities.     Florence will report no light or noise sensitivity in order to return to PLOF in regards to occupational, recreational, and school activities.     Florence will rehab to return to school without symptoms/restrictions.    Florence will rehab to return to working a full shift at work without increase in symptoms.    Florence Raygoza will demonstrate independence,  excellent understanding of and report compliance with HEP to facilitate the learning process to maximize PT benefits and to facilitate independent rehab program upon discharge.    Time Calculation  Start Time: 1504  Stop Time: 1549  Time Calculation (min): 45 min  PT Evaluation Time Entry  PT Evaluation (Low) Time Entry: 25  PT Therapeutic Procedures Time Entry  Neuromuscular Re-Education Time Entry: 20

## 2025-07-29 ENCOUNTER — APPOINTMENT (OUTPATIENT)
Dept: PHYSICAL THERAPY | Facility: CLINIC | Age: 25
End: 2025-07-29
Payer: COMMERCIAL

## 2025-07-30 ENCOUNTER — TREATMENT (OUTPATIENT)
Dept: PHYSICAL THERAPY | Facility: CLINIC | Age: 25
End: 2025-07-30
Payer: COMMERCIAL

## 2025-07-30 DIAGNOSIS — S06.0X0D CONCUSSION WITHOUT LOSS OF CONSCIOUSNESS, SUBSEQUENT ENCOUNTER: ICD-10-CM

## 2025-07-30 PROBLEM — S06.0X0A CONCUSSION WITH NO LOSS OF CONSCIOUSNESS: Status: ACTIVE | Noted: 2025-07-30

## 2025-07-30 PROCEDURE — 97110 THERAPEUTIC EXERCISES: CPT | Mod: GP

## 2025-07-30 PROCEDURE — 97112 NEUROMUSCULAR REEDUCATION: CPT | Mod: GP

## 2025-07-30 NOTE — PROGRESS NOTES
"Physical Therapy    Physical Therapy Treatment    Patient Name: Florence Raygoza  MRN: [id number]  Today's Date: 7/30/2025  Time Calculation  Start Time: 1610  Stop Time: 1708  Time Calculation (min): 58 min  Insurance - reviewed   Visit number: 2 (updated 07/30/25)   Payor: ANTHEM / Plan: ANTHEM HMP / Product Type: *No Product type* /    60 PT/OT/ST V PCY 3 USED at evaluation NO AUTH NEEDED PAYS % OOP MET   Referring Provider: Sheila Mccrary PA-C       Current Problem  Problem List Items Addressed This Visit           ICD-10-CM    Concussion with no loss of consciousness S06.0X0A       Precautions:  Fall Risk: Low   Past Surgical history: endoscopy, colonoscopy  Past Medical history:  IBS, chronic LBP being managed by PT     History of migraines:  No  History of ADHD and learning disorders:  No  History of anxiety or depression:  No   History of previous concussion: No    % HR max Calculator   Age 24   ? Blockers? (Y/N) n       Low Intensity    65% 125   Moderate Intensity   75% 144   High Intensity   85% 164       HR max 193       Polar HR Max 181       General  Subjective      Florence Raygoza denies any falls or complications since last session. Florence Raygoza reports that symptoms were good over the weekend, but worsened with school since Monday.  Adds \"today is the worse I felt since last week\" adding that she had an exam and practical today.      Florence Raygoza reports attempted compliance with HEP.    Baseline symptoms: Headache 8/10; Nausea 0/10; Dizziness 6/10; Fogginess 3/10    Aggie Tsang, SPT, participated during session.  IVida, PT, DPT, NCS directly supervised during session.         Objective   Vitals  Baseline:  /79 mmHg  HR 85 bpm    During session:  RPE max: 14  HR max: 187 bpm    Post session:  /82 mmHg   bpm    Time spent in max intensity zone but below age adjusted HR max: 3 minutes and 26 seconds  Time spent in high intensity: 4 minutes and 29 seconds  Time " spent in moderate intensity: 6 minutes and 24 seconds  Remaining time HR was below.    Treatments:  Abbreviation Key  NP = not performed this visit   NV = next visit  // = parallel    Assigned HEP- [facility] Access Code: [email or web address]   Exercises  - Seated Horizontal Saccades  - 2 x daily - 7 x weekly - 10-30 sec hold  - Seated Vertical Saccades  - 2 x daily - 7 x weekly - 10-30 sec hold  - Bicep Curl to Shoulder Press with Dumbbells  - 1 x daily - 7 x weekly - 3 sets - 10 reps  - Standing Bent Over Single Arm Scapular Row with Table Support  - 1 x daily - 7 x weekly - 3 sets - 10 reps  - Bent Over Tricep Extension with Counter Support  - 1 x daily - 7 x weekly - 3 sets - 10 reps  - Standing Shoulder Abduction with Dumbbells  - 1 x daily - 7 x weekly - 3 sets - 10 reps  - Standing Full Range Shoulder Flexion with Dumbbells  - 1 x daily - 7 x weekly - 3 sets - 10 reps    Neuromuscular Re-education: 10 minutes   Vestibular/ocular motor screening (VOMS) :   Baseline symptoms: Headache 8/10; Nausea 0/10; Dizziness 6/10; Fogginess 3/10    Horizontal saccades = WNL but increase in HA that quickly resolved  Vertical saccades = WNL and No symptoms    Convergence/pencil pushups = removed from HEP due to subjective reports of INC in HA over the weekend from 0-7 with 1 rep  Reviewed and updated HEP above  -Florence  advised symptoms should not increase >3 in intensity with completion of exercises - if so, Florence Raygoza has likely have done too much. Advised to decrease duration or # of sets if symptoms INC >3.   -Florence advised symptoms should return to baseline before completing next set.  -Florence advised that increase in symptoms should only be temporary and symptoms should return completely to the baseline level before continuing onto other exercises or tasks. Florence  advised to hold exercises for the day if it takes more than twenty minutes to resolve. Florence verbalized understanding.    Therapeutic Exercise:  48 minutes    Time  spent on Compton Treadmill Test  Speed 3.2 mph  Increased incline each minute  HR rest 85 bpm  Minute 1 = RPE 7, VAS 7,  bpm  Minute 2 = RPE 7, VAS 7,  bpm  Minute 3 = RPE 7, VAS 7,  bpm  Minute 4 = RPE 10, VAS 6,  bpm  Minute 5 = RPE 12, VAS 7,  bpm  Minute 6 = RPE 12, VAS 7,  bpm  Minute 7 = RPE 13, VAS 6,  bpm  Minute 8 = RPE 13, VAS 5,  bpm  Minute 9 = RPE 14, VAS 5,  bpm  Minute 10 = RPE 14, VAS 5,  bpm    Using 5# free weights  Bicep curls  Shoulder flexion to 90 degrees  Shoulder abduction to 90 degrees - mild increase in HA on final reps  Bent over rolls  Bent over shoulder ext  Squats with fixation - held from HEP due to mild increase in HA      Outpatient Education: Reviewed and updated home exercise program. Answered questions about home exercise program. Provided manual cues for correct performance of exercises. Provided verbal feedback to improve exercise technique. Education on stage 1 exertion with walking at self selected speed as tolerated, core/UE/LE strengthening ex in quiet areas, limit head movement and positional changes.  Florence Raygoza verbalizes understanding of all education provided: Yes    Assessment:  Florence Raygoza completed treatment today which focused upon updating oculomotor HEP due to subjective and with exertion assessment in order to address ongoing post concussion symptoms which limits participation in school and volleyball.  Florence presents with improving symptoms and only very mild symptoms with saccades that quickly returned to baseline.  Removed convergence from HEP due to subjective comments.  Florence able to perform Compton treadmill test and was able to work into high intensity zone with decreased symptoms reported.  However, unable to tolerate vestibular challenges so HEP issued performed in room with natural lighting and head movement limited.  Attempted squats with fixation but held due to increased symptoms  that returned to baseline. Updated HEP without incident.  Florence's  response to tx was: Patient tolerated therapeutic interventions well and without any adverse events. Improved independence with home exercises. Florence's Progress towards goals: Improved walking distance. Florence Raygoza continues to have ongoing post concussion symptoms of HA and dizziness limiting participation in household chores, recreational activities, and occupational tasks Further skilled therapy services are warranted to address the remaining impairments in order to progress towards functional goals. Florence left session with all questions answered and no increase in symptoms.      Plan:  Monitor response to updated HEP.  Progress exertion/VOMS as able.       Time Calculation  Start Time: 1610  Stop Time: 1708  Time Calculation (min): 58 min     PT Therapeutic Procedures Time Entry  Therapeutic Exercise Time Entry: 48  Neuromuscular Re-Education Time Entry: 10

## 2025-08-07 ENCOUNTER — EVALUATION (OUTPATIENT)
Dept: SPEECH THERAPY | Facility: CLINIC | Age: 25
End: 2025-08-07
Payer: COMMERCIAL

## 2025-08-07 DIAGNOSIS — S06.0X0S CONCUSSION WITHOUT LOSS OF CONSCIOUSNESS, SEQUELA: Primary | ICD-10-CM

## 2025-08-07 PROBLEM — S06.0X0A CONCUSSION WITHOUT LOSS OF CONSCIOUSNESS: Status: RESOLVED | Noted: 2025-08-07 | Resolved: 2025-08-07

## 2025-08-07 PROBLEM — S06.0X0A CONCUSSION WITHOUT LOSS OF CONSCIOUSNESS: Status: ACTIVE | Noted: 2025-08-07

## 2025-08-07 PROCEDURE — 92523 SPEECH SOUND LANG COMPREHEN: CPT | Mod: GN | Performed by: SPEECH-LANGUAGE PATHOLOGIST

## 2025-08-07 ASSESSMENT — PATIENT HEALTH QUESTIONNAIRE - PHQ9
5. POOR APPETITE OR OVEREATING: NOT AT ALL
9. THOUGHTS THAT YOU WOULD BE BETTER OFF DEAD, OR OF HURTING YOURSELF: NOT AT ALL
1. LITTLE INTEREST OR PLEASURE IN DOING THINGS: SEVERAL DAYS
3. TROUBLE FALLING OR STAYING ASLEEP OR SLEEPING TOO MUCH: NEARLY EVERY DAY
8. MOVING OR SPEAKING SO SLOWLY THAT OTHER PEOPLE COULD HAVE NOTICED. OR THE OPPOSITE, BEING SO FIGETY OR RESTLESS THAT YOU HAVE BEEN MOVING AROUND A LOT MORE THAN USUAL: NOT AT ALL
2. FEELING DOWN, DEPRESSED OR HOPELESS: NEARLY EVERY DAY
SUM OF ALL RESPONSES TO PHQ9 QUESTIONS 1 AND 2: 4
SUM OF ALL RESPONSES TO PHQ QUESTIONS 1-9: 10
6. FEELING BAD ABOUT YOURSELF - OR THAT YOU ARE A FAILURE OR HAVE LET YOURSELF OR YOUR FAMILY DOWN: NOT AT ALL
7. TROUBLE CONCENTRATING ON THINGS, SUCH AS READING THE NEWSPAPER OR WATCHING TELEVISION: MORE THAN HALF THE DAYS
4. FEELING TIRED OR HAVING LITTLE ENERGY: SEVERAL DAYS

## 2025-08-07 ASSESSMENT — PAIN SCALES - GENERAL: PAINLEVEL_OUTOF10: 0 - NO PAIN

## 2025-08-07 ASSESSMENT — PAIN - FUNCTIONAL ASSESSMENT: PAIN_FUNCTIONAL_ASSESSMENT: 0-10

## 2025-08-07 NOTE — PROGRESS NOTES
"Speech-Language Pathology    SLP Adult Outpatient Speech-Language Cognition Evaluation    Patient Name: Florence Raygoza  MRN: [id number]  Today's Date: 8/7/2025      Time Calculation  Start Time: 1102  Stop Time: 1143  Time Calculation (min): 41 min      Current Problem:   1. Concussion without loss of consciousness, sequela              SLP Assessment:  SLP Assessment  Patient presents with cognitive status within functional limits, however does report changes in cognitive function from baseline including increased processing time and difficulty concentrating. Upon assessment this date, patient scored well across domains on RBANS, with slightly delayed processing time noted. On the RPQ, patient scored following items as most impaired (\"moderate problem\"): headaches, feelings of dizziness, poor concentration.  Discussed with patient that she may experience improvement in cognitive symptoms during 2-week break from classes; however, should patient demonstrate difficulties when classes resume, she may return to clinic for further treatment in order to return to highest level of function s/p concussion. 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  SLP Assessment Results: Other (Comment) (mild cognitive changes s/p concussion, cognition overall WFL)  Treatment Provided: No  Education Provided: Yes      SLP Plan:  Plan  Inpatient/Swing Bed or Outpatient: Outpatient  SLP Plan: Skilled SLP  SLP Frequency: Monitor status and reassess as needed  Duration: 6 weeks  SLP Discharge Recommendations: Other (Comment) (pending progress)  Next Treatment Priority: Targeted cognitive deficits as warranted following 2-week school break  Discussed POC: Patient  Patient/Caregiver Agreeable: Yes    GOALS (established 8/7/25):  Short-term:  Patient will complete tasks " "simulating school-related activities (reading, studying, taking notes, taking exams) with 90% accuracy given minimal cues.  Patient will recall and implement strategies for improving concentration with 90% accuracy given minimal cues.    Long-term:  Patient will demonstrate reduction in symptoms as evidenced by improved scores on RPQ following treatment.    Subjective   Patient is a 24 year-old female with PMHx most significant for concussion sustained in July 2025, presenting to [facility] for evaluation of speech/language and cognition s/p concussion. Patient is an avid  and , and was hit in the head with a volleyball on 7/3. She   then fell backward on the concrete sidewalk on 7/4, exacerbating her symptoms. Since the concussion, patient reports difficulty concentrating, lots of headaches, and a feeling of \"soreness\" of her eyes. Patient also initially had some word-finding difficulties (e.g., when looking at a desk would say the word \"desk\" rather than what she was intending to say). Patient reports that symptoms have been improving gradually, particularly in the last week. 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 She has noticed that despite performing well on her exams, it has taken longer to study and take the exams than before the concussion. 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    SLP Visit Info:  SLP Received On: 08/07/25    General Visit Information:  General Information  Chart Reviewed: Yes  Arrival: Independent  Reason for Referral: Post-concussion  Referred By: Sheila Mccrary PA-C  Past Medical History Relevant to Rehab: s/p concussion Juky 2025; additional PMHx including GERD, IBS, chronic low back pain  Total Number of Visits : 1      Objective       Pain:  Pain Assessment  Pain " Assessment: 0-10  0-10 (Numeric) Pain Score: 0 - No pain      Cognition:  Cognition  Overall Cognitive Status: Within Functional Limits  Attention: Within Functional Limits  Memory: Within Funtional Limits  Processing Speed:  (slightly delayed)    Auditory Comprehension:   Auditory Comprehension  Conversation: Within Functional Limits    Reading Comprehension:  Reading Comprehension  Reading Status: Within funtional limits    Verbal:  Verbal Expression  Primary Mode of Expression: Verbal  Primary Language: English  Confrontation Naming: Within Functional Limits  Conversation: Within Functional Limits  Prosody: Within Functional Limits  Topic Initiation: Within Functional Limits  Topic Maintenance: Within Functional Limits    Written Expression:  Written Expression  Written Expression: Within Functional Limits  Formulation: No Impairment      SLP Outcome Measures:  [facility] Post Concussion Symptoms Questionnaire (RPQ) was administered:   RPQ-3*: 6/12   RPQ-13*: 16/52  *RPQ-3 consists of the first three items (headaches, dizziness, and nausea) and the RPQ-13 comprises the next 13 items. If there is a higher score on RPQ-3, earlier reassessment and closer monitoring is recommended.   *RPQ-13 has a score potential of 0-52, where high scores reflect greater severity of post concussive symptoms. These items are associated with having a greater impact on participation, psychosocial functioning, and lifestyle.      Repeatable Battery for the Assessment of Neuropsychological Status (RBANS)      Total Score Index Score Percentile   Immediate Memory  103 58   List Learning 34/40     Story Memory 19/24     Visuospatial/Constructional  112 79   Figure Copy 20/20     Line Orientation 19/20     Language  104 61   Picture Naming 10/10     Semantic Fluency 23/40     Attention  109 73   Digit Span 10/16     Coding 69/89     Delayed Memory  101 53   List Recall 7/10     List Recognition 20/20     Story Recall 10/12     Figure  Recall 20/20     TOTAL SCORE  107 68       Outpatient Education:  Adult Outpatient Education  Individual(s) Educated: Patient  Verbal Education : Results of assessment, recommendations, plan of care  Patient/Caregiver Demonstrated Understanding: no  Plan of Care Discussed and Agreed Upon: yes  Patient Response to Education: Patient/Caregiver Verbalized Understanding of Information, Patient/Caregiver Asked Appropriate Questions

## 2025-08-22 ENCOUNTER — TREATMENT (OUTPATIENT)
Dept: PHYSICAL THERAPY | Facility: CLINIC | Age: 25
End: 2025-08-22
Payer: COMMERCIAL

## 2025-08-22 DIAGNOSIS — S06.0X0D CONCUSSION WITHOUT LOSS OF CONSCIOUSNESS, SUBSEQUENT ENCOUNTER: ICD-10-CM

## 2025-08-22 PROCEDURE — 20560 NDL INSJ W/O NJX 1 OR 2 MUSC: CPT | Mod: GP

## 2025-08-22 PROCEDURE — 97112 NEUROMUSCULAR REEDUCATION: CPT | Mod: GP

## 2025-08-22 PROCEDURE — 97140 MANUAL THERAPY 1/> REGIONS: CPT | Mod: GP

## 2025-08-27 ENCOUNTER — TELEMEDICINE CLINICAL SUPPORT (OUTPATIENT)
Dept: SPEECH THERAPY | Facility: CLINIC | Age: 25
End: 2025-08-27
Payer: COMMERCIAL

## 2025-08-27 DIAGNOSIS — R41.841 COGNITIVE COMMUNICATION DEFICIT: ICD-10-CM

## 2025-08-27 DIAGNOSIS — S06.0X0D CONCUSSION WITHOUT LOSS OF CONSCIOUSNESS, SUBSEQUENT ENCOUNTER: Primary | ICD-10-CM

## 2025-08-27 PROCEDURE — 97129 THER IVNTJ 1ST 15 MIN: CPT | Mod: GN,95 | Performed by: SPEECH-LANGUAGE PATHOLOGIST

## 2025-08-27 PROCEDURE — 97130 THER IVNTJ EA ADDL 15 MIN: CPT | Mod: GN,95 | Performed by: SPEECH-LANGUAGE PATHOLOGIST

## 2025-08-27 ASSESSMENT — PAIN SCALES - GENERAL: PAINLEVEL_OUTOF10: 0 - NO PAIN

## 2025-08-27 ASSESSMENT — PAIN - FUNCTIONAL ASSESSMENT: PAIN_FUNCTIONAL_ASSESSMENT: 0-10

## 2025-09-03 ENCOUNTER — TELEMEDICINE CLINICAL SUPPORT (OUTPATIENT)
Dept: SPEECH THERAPY | Facility: CLINIC | Age: 25
End: 2025-09-03
Payer: COMMERCIAL

## 2025-09-03 DIAGNOSIS — S06.0X0D CONCUSSION WITHOUT LOSS OF CONSCIOUSNESS, SUBSEQUENT ENCOUNTER: ICD-10-CM

## 2025-09-03 DIAGNOSIS — R41.841 COGNITIVE COMMUNICATION DEFICIT: ICD-10-CM

## 2025-09-03 PROCEDURE — 97130 THER IVNTJ EA ADDL 15 MIN: CPT | Mod: GN,95 | Performed by: SPEECH-LANGUAGE PATHOLOGIST

## 2025-09-03 PROCEDURE — 97129 THER IVNTJ 1ST 15 MIN: CPT | Mod: GN,95 | Performed by: SPEECH-LANGUAGE PATHOLOGIST

## 2025-09-03 ASSESSMENT — PAIN - FUNCTIONAL ASSESSMENT: PAIN_FUNCTIONAL_ASSESSMENT: 0-10

## 2025-09-03 ASSESSMENT — PAIN SCALES - GENERAL: PAINLEVEL_OUTOF10: 0 - NO PAIN

## 2025-09-12 ENCOUNTER — APPOINTMENT (OUTPATIENT)
Dept: PHYSICAL THERAPY | Facility: CLINIC | Age: 25
End: 2025-09-12
Payer: COMMERCIAL